# Patient Record
Sex: FEMALE | Race: WHITE | Employment: FULL TIME | URBAN - METROPOLITAN AREA
[De-identification: names, ages, dates, MRNs, and addresses within clinical notes are randomized per-mention and may not be internally consistent; named-entity substitution may affect disease eponyms.]

---

## 2017-01-03 ENCOUNTER — OFFICE VISIT (OUTPATIENT)
Dept: FAMILY MEDICINE | Facility: CLINIC | Age: 52
End: 2017-01-03
Payer: COMMERCIAL

## 2017-01-03 VITALS
DIASTOLIC BLOOD PRESSURE: 84 MMHG | HEIGHT: 65 IN | HEART RATE: 74 BPM | SYSTOLIC BLOOD PRESSURE: 129 MMHG | TEMPERATURE: 96.9 F | OXYGEN SATURATION: 100 % | WEIGHT: 156.8 LBS | BODY MASS INDEX: 26.12 KG/M2

## 2017-01-03 DIAGNOSIS — M76.891 ENTHESOPATHY OF RIGHT HIP REGION: Primary | ICD-10-CM

## 2017-01-03 DIAGNOSIS — Z12.31 ENCOUNTER FOR SCREENING MAMMOGRAM FOR BREAST CANCER: ICD-10-CM

## 2017-01-03 PROCEDURE — 99213 OFFICE O/P EST LOW 20 MIN: CPT | Performed by: FAMILY MEDICINE

## 2017-01-03 NOTE — NURSING NOTE
"Chief Complaint   Patient presents with     Patient Request for Note/Letter     patient is requesting a note for FMLA when her hip/bursitis is bothering her       Initial /84 mmHg  Pulse 74  Temp(Src) 96.9  F (36.1  C) (Tympanic)  Ht 5' 4.75\" (1.645 m)  Wt 156 lb 12.8 oz (71.124 kg)  BMI 26.28 kg/m2  SpO2 100%  LMP 01/20/2012 Estimated body mass index is 26.28 kg/(m^2) as calculated from the following:    Height as of this encounter: 5' 4.75\" (1.645 m).    Weight as of this encounter: 156 lb 12.8 oz (71.124 kg).  BP completed using cuff size: regular  "

## 2017-01-03 NOTE — Clinical Note
[facility]  [address]  [facility] [address]  Phone: [phone]    January 3, 2017      RE: Lien Quintana  [address]   [place] [address]       To whom it may concern:    Lien Quintana was seen in our clinic today. She is an established patient of mine.  She has hip bursitis and may require intermittent leave of 1-2 days a month when pain flares.          Sincerely,      Brien Baez MD  [facility] [place]

## 2017-01-03 NOTE — PROGRESS NOTES
"  SUBJECTIVE:                                                    Lien Quintana is a 51 year old female who presents to clinic today for the following health issues:      Chief Complaint   Patient presents with     Patient Request for Note/Letter     patient is requesting a note for FMLA when her hip/bursitis is bothering her     Hip Bursitis: missed one day last month of work.  Taking tylenol and IBP when it flares up.  Has FMLA with intermittent leave restrictions written, but employer requested a follow up letter.    FMLA good through 7/2017.  Has done Physical therapy with good success, but ocassionally gets flares still.  When it flares pain is in the right hip greater trochanter and radiates down the side of the leg and at times to the buttock.      Problem list and histories reviewed & adjusted, as indicated.  Additional history: as documented    Problem list, Medication list, Allergies, and Medical/Social/Surgical histories reviewed in EPIC and updated as appropriate.    ROS:  C: NEGATIVE for fever, chills, change in weight  MUSCULOSKELETAL: NEGATIVE for significant arthralgias or myalgia  NEURO: NEGATIVE for weakness, dizziness or paresthesias    OBJECTIVE:                                                    /84 mmHg  Pulse 74  Temp(Src) 96.9  F (36.1  C) (Tympanic)  Ht 5' 4.75\" (1.645 m)  Wt 156 lb 12.8 oz (71.124 kg)  BMI 26.28 kg/m2  SpO2 100%  LMP 01/20/2012  Body mass index is 26.28 kg/(m^2).  GENERAL: healthy, alert and no distress  MS: no gross musculoskeletal defects noted, no edema  NEURO: normal gait    Diagnostic Test Results:  none      ASSESSMENT/PLAN:                                                        Lien was seen today for patient request for note/letter and health maintenance.    Diagnoses and all orders for this visit:    Enthesopathy of right hip region: stable  -letter written    Encounter for screening mammogram for breast cancer  -     *MA Screening Digital Bilateral; " Future          Brien Baez MD  [facility]

## 2017-01-03 NOTE — PATIENT INSTRUCTIONS
Thank you for choosing [facility].  You may be receiving a survey in the mail from [employer] regarding your visit today.  Please take a few minutes to complete and return the survey to let us know how we are doing.      If you have questions or concerns, please contact us via [employer] or you can contact your care team at [phone].    Our Clinic hours are:  Monday 6:40 am  to 7:00 pm  Tuesday -Friday 6:40 am to 5:00 pm    The [place] outpatient lab hours are:  Monday - Friday 6:10 am to 4:45 pm  Saturdays 7:00 am to 11:00 am  Appointments are required, call [phone]    If you have clinical questions after hours or would like to schedule an appointment,  call the clinic at [phone].

## 2017-04-07 DIAGNOSIS — Z12.11 SCREENING FOR COLON CANCER: Primary | ICD-10-CM

## 2017-04-07 DIAGNOSIS — Z12.31 ENCOUNTER FOR SCREENING MAMMOGRAM FOR BREAST CANCER: ICD-10-CM

## 2017-08-09 ENCOUNTER — TELEPHONE (OUTPATIENT)
Dept: FAMILY MEDICINE | Facility: CLINIC | Age: 52
End: 2017-08-09

## 2017-08-09 DIAGNOSIS — E03.9 HYPOTHYROIDISM, UNSPECIFIED TYPE: ICD-10-CM

## 2017-08-09 RX ORDER — LEVOTHYROXINE SODIUM 150 UG/1
150 TABLET ORAL DAILY
Qty: 90 TABLET | Refills: 0 | Status: SHIPPED | OUTPATIENT
Start: 2017-08-09 | End: 2017-11-10

## 2017-08-09 NOTE — TELEPHONE ENCOUNTER
Prescription approved per [facility] Refill Protocol.  Left message to notify patient.    Sera Dunne RN

## 2017-08-09 NOTE — TELEPHONE ENCOUNTER
Synthroid   Last Written Prescription Date: 11/18/16  Last Quantity: 90, # refills: 1  [place] Office Visit with [facility] or [facility] prescribing provider: 1/3/2017        TSH   Date Value Ref Range Status   11/18/2016 1.97 0.40 - 4.00 mU/L Final     Patient has moved and would like a couple of months refill, that will give her time to establish care and get insurance squared away.  [facility] in [place], is where she would like it sent.  Poly Martino  [facility]

## 2017-08-30 ENCOUNTER — MYC MEDICAL ADVICE (OUTPATIENT)
Dept: FAMILY MEDICINE | Facility: CLINIC | Age: 52
End: 2017-08-30

## 2017-08-31 ENCOUNTER — MYC MEDICAL ADVICE (OUTPATIENT)
Dept: FAMILY MEDICINE | Facility: CLINIC | Age: 52
End: 2017-08-31

## 2017-09-05 ENCOUNTER — MYC MEDICAL ADVICE (OUTPATIENT)
Dept: FAMILY MEDICINE | Facility: CLINIC | Age: 52
End: 2017-09-05

## 2017-10-31 ENCOUNTER — TELEPHONE (OUTPATIENT)
Dept: FAMILY MEDICINE | Facility: CLINIC | Age: 52
End: 2017-10-31

## 2017-10-31 NOTE — TELEPHONE ENCOUNTER
Panel Management Review      Composite cancer screening  Chart review shows that this patient is due/due soon for the following Mammogram, Colonoscopy and Fecal Colorectal (FIT)  Summary:    Patient is due/failing the following:   COLONOSCOPY, FIT and MAMMOGRAM    Action needed:   Patient needs referral/order: colonoscopy/FIT, mammogram    Type of outreach:    Sent [employer] message.    Questions for provider review:    None                                                                                                                                    Sharmila Loving CMA..........10/31/2017 11:18 AM

## 2017-10-31 NOTE — LETTER
November 7, 2017      Lien Quintana  [address]   [place] [address]        Dear Lien,     We care about your health and have reviewed your health plan and are making recommendations based on this review, to optimize your health.     You are in particular need of attention regarding:   -Breast Cancer Screening   -Colon Cancer Screening     We are recommending that you:                                                                                                                                        -schedule a MAMMOGRAM which is due.     1 in 8 women will develop invasive breast cancer during her lifetime and it is the most common non-skin cancer in American women.  EARLY detection, new treatments, and a better understanding of the disease have increased survival rates - the 5 year survival rate in the 1960s was 63% and today it is close to 90%.     If you are under/uninsured, we recommend you contact the [facility]. They offer mammograms at no charge or on a sliding fee charge. You can schedule with them at [phone]. Please have them to send us the results.       Please disregard this reminder if you have had this exam elsewhere within the last year.  It would be helpful for us to have a copy of your mammogram report in our file so that we can best coordinate your care - please contact us with when your test was done so we can update your record.                                                                                                                        -schedule a COLONOSCOPY to look for colon cancer (due every 10 years or 5 years in higher risk situations.)       Colon cancer is now the second leading cause of cancer-related deaths in the [place] for both men and women and there are over 130,000 new cases and 50,000 deaths per year from colon cancer.  Colonoscopies can prevent 90-95% of these deaths.  Problem lesions can be removed before they ever become cancer.  This test is  not only looking for cancer, but also getting rid of precancerious lesions.       If you are under/uninsured, we recommend you contact the [employer] program. [employer] is a free colorectal cancer screening program that provides colonoscopies for eligible under/uninsured [place] men and women. If you are interested in receiving a free colonoscopy, please call [employer] at [phone] (mention code [employer]) to see if you're eligible.     If you do not wish to do a colonoscopy or cannot afford to do one, at this time, there is another option. It is called a FIT test or Fecal Immunochemical Occult Blood Test (take home stool sample kit).  It does not replace the colonoscopy for colorectal cancer screening, but it can detect hidden bleeding in the lower colon.  It does need to be repeated every year and if a positive result is obtained, you would be referred for a colonoscopy.       If you have completed either one of these tests or had a flexible sigmoidoscopy in the past five years at another facility, please have the records sent to our clinic so that we can best coordinate your care and update your chart.  Please call us if you have questions or would like arrange either to do a colonoscopy or obtain the necessary test kit for the Fecal Occult Test      Sincerely,        Brien Baez MD/ [facility]

## 2017-11-07 NOTE — TELEPHONE ENCOUNTER
Patient did not read [employer] message. Letter sent.  Sharmila Loving CMA..........11/7/2017 4:22 PM

## 2017-11-10 DIAGNOSIS — E03.9 HYPOTHYROIDISM, UNSPECIFIED TYPE: ICD-10-CM

## 2017-11-14 RX ORDER — LEVOTHYROXINE SODIUM 150 UG/1
150 TABLET ORAL DAILY
Qty: 30 TABLET | Refills: 0 | Status: SHIPPED | OUTPATIENT
Start: 2017-11-14

## 2020-03-01 ENCOUNTER — HEALTH MAINTENANCE LETTER (OUTPATIENT)
Age: 55
End: 2020-03-01

## 2020-12-14 ENCOUNTER — HEALTH MAINTENANCE LETTER (OUTPATIENT)
Age: 55
End: 2020-12-14

## 2021-04-18 ENCOUNTER — HEALTH MAINTENANCE LETTER (OUTPATIENT)
Age: 56
End: 2021-04-18

## 2021-10-02 ENCOUNTER — HEALTH MAINTENANCE LETTER (OUTPATIENT)
Age: 56
End: 2021-10-02

## 2022-03-19 ENCOUNTER — HEALTH MAINTENANCE LETTER (OUTPATIENT)
Age: 57
End: 2022-03-19

## 2022-05-14 ENCOUNTER — HEALTH MAINTENANCE LETTER (OUTPATIENT)
Age: 57
End: 2022-05-14

## 2022-09-03 ENCOUNTER — HEALTH MAINTENANCE LETTER (OUTPATIENT)
Age: 57
End: 2022-09-03

## 2023-06-03 ENCOUNTER — HEALTH MAINTENANCE LETTER (OUTPATIENT)
Age: 58
End: 2023-06-03